# Patient Record
Sex: MALE | URBAN - METROPOLITAN AREA
[De-identification: names, ages, dates, MRNs, and addresses within clinical notes are randomized per-mention and may not be internally consistent; named-entity substitution may affect disease eponyms.]

---

## 2022-11-01 ENCOUNTER — HOSPITAL ENCOUNTER (OUTPATIENT)
Dept: TELEMEDICINE | Facility: HOSPITAL | Age: 72
Discharge: HOME | End: 2022-11-01

## 2024-07-10 ENCOUNTER — HOSPITAL ENCOUNTER (OUTPATIENT)
Dept: TELEMEDICINE | Facility: HOSPITAL | Age: 74
Discharge: HOME | End: 2024-07-10

## 2024-07-10 DIAGNOSIS — Z86.73 HISTORY OF STROKE: Primary | ICD-10-CM

## 2024-07-10 PROCEDURE — G0426 INPT/ED TELECONSULT50: HCPCS | Mod: GT,,, | Performed by: PSYCHIATRY & NEUROLOGY

## 2024-07-10 NOTE — TELEMEDICINE CONSULT
[facility]  Vascular Neurology  Comprehensive Stroke Center  TeleVascular Neurology Acute Consultation Note        Consult Information  Consults    Consulting Provider: MAVIS ABAD JR   Current Providers  No providers found    Patient Location: [facility] Emergency Department    Spoke hospital nurse at bedside with patient assisting consultant.  Patient information was obtained from patient and nursing home.       Stroke Documentation  Acute Stroke Times   Last Known Normal Date: 07/09/24  Unknown Normal Time: Unknown Time  Stroke Team Called Time: 1028  Stroke Team Arrival Time: 1030  Thrombolytic Therapy Recommended: No  Thrombectomy Recommended: No    NIH Scale:  1a. Level of Consciousness: 0-->Alert, keenly responsive  1b. LOC Questions: 0-->Answers both questions correctly  1c. LOC Commands: 0-->Performs both tasks correctly  2. Best Gaze: 0-->Normal  3. Visual: 0-->No visual loss  4. Facial Palsy: 0-->Normal symmetrical movements  5a. Motor Arm, Left: 0-->No drift, limb holds 90 (or 45) degrees for full 10 secs  5b. Motor Arm, Right: 0-->No drift, limb holds 90 (or 45) degrees for full 10 secs  6a. Motor Leg, Left: 0-->No drift, leg holds 30 degree position for full 5 secs  6b. Motor Leg, Right: 0-->No drift, leg holds 30 degree position for full 5 secs  7. Limb Ataxia: 0-->Absent  8. Sensory: 0-->Normal, no sensory loss  9. Best Language: 0-->No aphasia, normal  10. Dysarthria: 0-->Normal  11. Extinction and Inattention (formerly Neglect): 0-->No abnormality  Total (NIH Stroke Scale): 0      Modified [place]:    Dasia Coma Scale:     ABCD2 Score:    [id number] Score:    HAS -BLED Score:    ICH Score:    Hunt & Morgan Classification:      There were no vitals taken for this visit.    Van Negative  In your opinion, this was a: Tier 2     Medical Decision Making  HPI:  74 y.o. male , nursing home called this AM and said he wasn't  exactly normal / baseline; he had slurred speech and pocketing food and left facial droop.  Hx of 2 strokes with left sided weakness.     Imaging personally reviewed & interpreted:  CT Brain: no evidence of early or subacute ischemic changes, intracerebral hemorrhage or hyperdense vessel signs; chronic R hemispheric watershed appearing infarct  CTA Head & Neck: Not performed at time of consultation       Assessment and plan:  History of Stroke - no clear baseline - apparently plegia is his baseline.  Out of window for lytics, unknown time of onset.   Recommend MRI to further evaluate presence of new infarct.  Recommend:  - STAT CTA Head & Neck  for vessel imaging ASAP to r/o high risk attributable lesion for further risk stratification and mgmt  - MRI brain w/o contrast to evaluate for presence and characterization of infarct  - load aspirin 325 mg & clopidogrel 300 mg x 1 now, followed by daily aspirin 81 mg /  clopidogrel 75 mg daily - can start w/ asa 81 until imaging confirms new infarct  - Transothoracic echocardiogram  - lipid profile and A1c for evaluation of modifiable risk factors  - PT/OT/SLP eval and Rx  - Permissive HTN < 220/120 mmHg x 48-72h pending results of vessel imaging        Lytics recommendation: Thrombolytic therapy not recommended due to Unknown/unclear onset   Thrombectomy recommendation: No; at this time symptoms not suggestive of large vessel occlusion  Placement recommendation: admit to inpatient                  ROS  Physical Exam  No past medical history on file.  No past surgical history on file.  No family history on file.    Diagnoses  Problem Noted   History of Stroke 7/10/2024       Orlando Mccloud MD      Emergent/Acute neurological consultation requested by spoke provider due to critical concerns for possible cerebrovascular event that could result in permanent loss of neurologic/bodily function, severe disability or death of this patient.  Immediate/timely evaluation by a highly  prepared expert is paramount for optimal outcomes  High risk for neurological deterioration if not properly diagnosed  High risk for neurological deterioration if not treated promplty/as soon as possible  Complex diagnostic evaluation may be required (advanced imaging)  High risk treatment options (thrombolytics and/or thrombectomy)    Patient care was coordinated with spoke provider, including but not limted to    Discussing likely diagnosis/etiology of symptoms  Making recommendations for further diagnostic studies  Making recommendations for intravenous thrombolytics or other advanced therapies  Making recommendations for disposition (admission/transfer for higher level of care)

## 2024-07-10 NOTE — SUBJECTIVE & OBJECTIVE
HPI:  74 y.o. male , nursing home called this AM and said he wasn't exactly normal / baseline; he had slurred speech and pocketing food and left facial droop.  Hx of 2 strokes with left sided weakness.     Imaging personally reviewed & interpreted:  CT Brain: no evidence of early or subacute ischemic changes, intracerebral hemorrhage or hyperdense vessel signs; chronic R hemispheric watershed appearing infarct  CTA Head & Neck: Not performed at time of consultation       Assessment and plan:  History of Stroke - no clear baseline - apparently plegia is his baseline.  Out of window for lytics, unknown time of onset.   Recommend MRI to further evaluate presence of new infarct.  Recommend:  - STAT CTA Head & Neck  for vessel imaging ASAP to r/o high risk attributable lesion for further risk stratification and mgmt  - MRI brain w/o contrast to evaluate for presence and characterization of infarct  - load aspirin 325 mg & clopidogrel 300 mg x 1 now, followed by daily aspirin 81 mg /  clopidogrel 75 mg daily - can start w/ asa 81 until imaging confirms new infarct  - Transothoracic echocardiogram  - lipid profile and A1c for evaluation of modifiable risk factors  - PT/OT/SLP eval and Rx  - Permissive HTN < 220/120 mmHg x 48-72h pending results of vessel imaging        Lytics recommendation: Thrombolytic therapy not recommended due to Unknown/unclear onset   Thrombectomy recommendation: No; at this time symptoms not suggestive of large vessel occlusion  Placement recommendation: admit to inpatient